# Patient Record
Sex: MALE | Race: WHITE | NOT HISPANIC OR LATINO | URBAN - METROPOLITAN AREA
[De-identification: names, ages, dates, MRNs, and addresses within clinical notes are randomized per-mention and may not be internally consistent; named-entity substitution may affect disease eponyms.]

---

## 2018-11-27 ENCOUNTER — IMPORTED ENCOUNTER (OUTPATIENT)
Dept: URBAN - METROPOLITAN AREA CLINIC 43 | Facility: CLINIC | Age: 83
End: 2018-11-27

## 2018-11-27 PROBLEM — H02.835: Noted: 2018-11-27

## 2018-11-27 PROBLEM — H02.832: Noted: 2018-11-27

## 2018-11-27 PROBLEM — H02.834: Noted: 2018-11-27

## 2018-11-27 PROBLEM — H02.831: Noted: 2018-11-27

## 2018-11-27 PROBLEM — H25.13: Noted: 2018-11-27

## 2018-12-05 ENCOUNTER — IMPORTED ENCOUNTER (OUTPATIENT)
Dept: URBAN - METROPOLITAN AREA CLINIC 43 | Facility: CLINIC | Age: 83
End: 2018-12-05

## 2018-12-05 PROBLEM — H25.13: Noted: 2018-12-05

## 2018-12-05 PROBLEM — H43.813: Noted: 2018-12-05

## 2020-04-19 ASSESSMENT — VISUAL ACUITY
OS_CC: 20/25+2
OD_CC: 20/20-1
OD_PH: 20/30+2
OS_CC: 20/20-1
OS_SC: J3-2
OD_CC: J1+-1
OD_SC: J7
OS_CC: J2+
OS_CC: @ 18"
OS_CC: 20/25
OD_CC: @ 18"
OD_CC: 20/40-2
OS_CC: J1+-2
OS_SC: 20/30
OD_SC: J7
OS_SC: J3-2
OD_CC: J2
OS_SC: 20/25+3
OD_SC: 20/40-2
OS_SC: 20/30-1
OD_SC: 20/25+3
OS_OTHER: 20/40.
OD_SC: 20/30-1
OD_CC: 20/30-2
OD_OTHER: 20/40.

## 2020-04-19 ASSESSMENT — KERATOMETRY
OS_K2POWER_DIOPTERS: 42
OS_AXISANGLE_DEGREES: 25
OS_K1POWER_DIOPTERS: 42.5
OS_AXISANGLE2_DEGREES: 115
OD_K1POWER_DIOPTERS: 42.5
OD_AXISANGLE_DEGREES: 155
OD_K2POWER_DIOPTERS: 41.5
OD_AXISANGLE2_DEGREES: 65

## 2020-04-19 ASSESSMENT — TONOMETRY
OD_IOP_MMHG: 18.0
OD_IOP_MMHG: 17.0
OS_IOP_MMHG: 14.0
OS_IOP_MMHG: 15.0

## 2020-12-02 ENCOUNTER — OFFICE VISIT (OUTPATIENT)
Dept: URBAN - METROPOLITAN AREA CLINIC 68 | Facility: CLINIC | Age: 85
End: 2020-12-02

## 2020-12-31 ENCOUNTER — OFFICE VISIT (OUTPATIENT)
Dept: URBAN - METROPOLITAN AREA CLINIC 68 | Facility: CLINIC | Age: 85
End: 2020-12-31

## 2021-01-20 ENCOUNTER — OFFICE VISIT (OUTPATIENT)
Dept: URBAN - METROPOLITAN AREA CLINIC 68 | Facility: CLINIC | Age: 86
End: 2021-01-20

## 2021-02-12 ENCOUNTER — OFFICE VISIT (OUTPATIENT)
Dept: URBAN - METROPOLITAN AREA CLINIC 68 | Facility: CLINIC | Age: 86
End: 2021-02-12

## 2022-02-21 ENCOUNTER — OFFICE VISIT (OUTPATIENT)
Dept: URBAN - METROPOLITAN AREA CLINIC 68 | Facility: CLINIC | Age: 87
End: 2022-02-21

## 2022-06-04 ENCOUNTER — TELEPHONE ENCOUNTER (OUTPATIENT)
Dept: URBAN - METROPOLITAN AREA CLINIC 68 | Facility: CLINIC | Age: 87
End: 2022-06-04

## 2022-06-04 RX ORDER — TADALAFIL 5 MG/1
CIALIS( 5MG ORAL  QOD ) INACTIVE -HX ENTRY TABLET, FILM COATED ORAL QOD
OUTPATIENT
Start: 2020-12-02

## 2022-06-04 RX ORDER — LAMOTRIGINE 25 MG/1
LAMICTAL( 25MG ORAL 2 TABLETS DAILY ) INACTIVE -HX ENTRY TABLET ORAL DAILY
OUTPATIENT
Start: 2017-03-27

## 2022-06-04 RX ORDER — ASPIRIN 325 MG/1
ASPIRIN( 325MG ORAL  DAILY ) INACTIVE -HX ENTRY TABLET ORAL DAILY
OUTPATIENT
Start: 2017-03-27

## 2022-06-04 RX ORDER — FLUTICASONE PROPIONATE AND SALMETEROL 50; 250 UG/1; UG/1
ADVAIR DISKUS( 250-50MCG/DOSE INHALATION  AS NEEDED ) INACTIVE -HX ENTRY POWDER RESPIRATORY (INHALATION) AS NEEDED
OUTPATIENT
Start: 2017-03-27

## 2022-06-04 RX ORDER — POLYETHYLENE GLYCOL 3350, SODIUM SULFATE, SODIUM CHLORIDE, POTASSIUM CHLORIDE, ASCORBIC ACID, SODIUM ASCORBATE 7.5-2.691G
KIT ORAL
Qty: 1 | Refills: 0 | OUTPATIENT
Start: 2013-11-11 | End: 2017-03-27

## 2022-06-04 RX ORDER — LOSARTAN POTASSIUM 100 MG/1
LOSARTAN POTASSIUM( 100MG ORAL  DAILY ) INACTIVE -HX ENTRY TABLET ORAL DAILY
OUTPATIENT
Start: 2017-03-27

## 2022-06-04 RX ORDER — METHYLCELLULOSE 500 MG/1
TABLET ORAL DAILY
Qty: 60 | Refills: 0 | OUTPATIENT
Start: 2019-01-24 | End: 2019-02-23

## 2022-06-05 ENCOUNTER — TELEPHONE ENCOUNTER (OUTPATIENT)
Dept: URBAN - METROPOLITAN AREA CLINIC 68 | Facility: CLINIC | Age: 87
End: 2022-06-05

## 2022-06-05 RX ORDER — LEVOTHYROXINE SODIUM 125 UG/1
SYNTHROID( 125MCG ORAL  DAILY ) ACTIVE -HX ENTRY TABLET ORAL DAILY
Status: ACTIVE | COMMUNITY
Start: 2022-02-21

## 2022-06-05 RX ORDER — METOPROLOL SUCCINATE 100 MG/1
METOPROLOL SUCCINATE ER( 100MG ORAL 1 DAILY ) ACTIVE -HX ENTRY TABLET, EXTENDED RELEASE ORAL DAILY
Status: ACTIVE | COMMUNITY
Start: 2022-02-21

## 2022-06-05 RX ORDER — FINASTERIDE 5 MG/1
FINASTERIDE( 5MG ORAL 1 DAILY ) ACTIVE -HX ENTRY TABLET, FILM COATED ORAL DAILY
Status: ACTIVE | COMMUNITY
Start: 2022-02-21

## 2022-06-05 RX ORDER — AZELASTINE HYDROCHLORIDE 0.5 MG/ML
AZELASTINE HCL( 0.05% OPHTHALMIC 1 DAILY ) ACTIVE -HX ENTRY SOLUTION/ DROPS OPHTHALMIC DAILY
Status: ACTIVE | COMMUNITY
Start: 2022-02-21

## 2022-06-05 RX ORDER — FAMOTIDINE 20 MG/1
TABLET, FILM COATED ORAL
Qty: 90 | Refills: 90 | Status: ACTIVE | COMMUNITY
Start: 2020-12-02

## 2022-06-05 RX ORDER — LAMOTRIGINE 50 MG/1
LAMOTRIGINE( 50MG ORAL  DAILY ) ACTIVE -HX ENTRY TABLET, ORALLY DISINTEGRATING ORAL DAILY
Status: ACTIVE | COMMUNITY
Start: 2022-02-21

## 2022-06-05 RX ORDER — LORATADINE 10 MG
TABLET,DISINTEGRATING ORAL
Qty: 30 | Refills: 30 | Status: ACTIVE | COMMUNITY
Start: 2019-01-24

## 2022-06-05 RX ORDER — ATORVASTATIN CALCIUM 40 MG/1
ATORVASTATIN CALCIUM( 40MG ORAL 1 DAILY ) ACTIVE -HX ENTRY TABLET, FILM COATED ORAL DAILY
Status: ACTIVE | COMMUNITY
Start: 2022-02-21

## 2022-06-05 RX ORDER — TRAMADOL HYDROCHLORIDE 50 MG/1
TRAMADOL HCL( 50MG ORAL 1 DAILY ) ACTIVE -HX ENTRY TABLET ORAL DAILY
Status: ACTIVE | COMMUNITY
Start: 2022-02-21

## 2022-06-25 ENCOUNTER — TELEPHONE ENCOUNTER (OUTPATIENT)
Age: 87
End: 2022-06-25

## 2022-06-25 RX ORDER — POLYETHYLENE GLYCOL 3350 17 G
MIRALAX(    AS DIRECTED ) INACTIVE -HX ENTRY POWDER IN PACKET (EA) ORAL AS DIRECTED
OUTPATIENT
Start: 2020-12-02

## 2022-06-25 RX ORDER — METHYLCELLULOSE 500 MG/1
TABLET ORAL DAILY
Qty: 60 | Refills: 0 | OUTPATIENT
Start: 2019-01-24 | End: 2019-02-23

## 2022-06-25 RX ORDER — LOSARTAN POTASSIUM 100 MG/1
LOSARTAN POTASSIUM( 100MG ORAL  DAILY ) INACTIVE -HX ENTRY TABLET, FILM COATED ORAL DAILY
OUTPATIENT
Start: 2017-03-27

## 2022-06-25 RX ORDER — POLYETHYLENE GLYCOL 3350, SODIUM SULFATE, SODIUM CHLORIDE, POTASSIUM CHLORIDE, ASCORBIC ACID, SODIUM ASCORBATE 7.5-2.691G
KIT ORAL
Qty: 1 | Refills: 0 | OUTPATIENT
Start: 2013-11-11 | End: 2017-03-27

## 2022-06-25 RX ORDER — ASPIRIN 325 MG/1
ASPIRIN( 325MG ORAL  DAILY ) INACTIVE -HX ENTRY TABLET ORAL DAILY
OUTPATIENT
Start: 2017-03-27

## 2022-06-25 RX ORDER — TADALAFIL 5 MG/1
CIALIS( 5MG ORAL  QOD ) INACTIVE -HX ENTRY TABLET, FILM COATED ORAL QOD
OUTPATIENT
Start: 2020-12-02

## 2022-06-25 RX ORDER — LAMOTRIGINE 25 MG/1
LAMICTAL( 25MG ORAL 2 TABLETS DAILY ) INACTIVE -HX ENTRY TABLET ORAL DAILY
OUTPATIENT
Start: 2017-03-27

## 2022-06-26 ENCOUNTER — TELEPHONE ENCOUNTER (OUTPATIENT)
Age: 87
End: 2022-06-26

## 2022-06-26 RX ORDER — TRAMADOL HYDROCHLORIDE 50 MG/1
TRAMADOL HCL( 50MG ORAL 1 DAILY ) ACTIVE -HX ENTRY TABLET ORAL DAILY
Status: ACTIVE | COMMUNITY
Start: 2022-02-21

## 2022-06-26 RX ORDER — TRIAMCINOLONE ACETONIDE 55 UG/1
NASACORT( 55MCG/ACT NASAL 1 DAILY ) ACTIVE -HX ENTRY SPRAY, METERED NASAL DAILY
Status: ACTIVE | COMMUNITY
Start: 2022-02-21

## 2022-06-26 RX ORDER — ATORVASTATIN CALCIUM 40 MG/1
ATORVASTATIN CALCIUM( 40MG ORAL 1 DAILY ) ACTIVE -HX ENTRY TABLET, FILM COATED ORAL DAILY
Status: ACTIVE | COMMUNITY
Start: 2022-02-21

## 2022-06-26 RX ORDER — AZELASTINE HYDROCHLORIDE 0.5 MG/ML
AZELASTINE HCL( 0.05% OPHTHALMIC 1 DAILY ) ACTIVE -HX ENTRY SOLUTION/ DROPS INTRAOCULAR DAILY
Status: ACTIVE | COMMUNITY
Start: 2022-02-21

## 2022-06-26 RX ORDER — LAMOTRIGINE 50 MG/1
LAMOTRIGINE( 50MG ORAL  DAILY ) ACTIVE -HX ENTRY TABLET ORAL DAILY
Status: ACTIVE | COMMUNITY
Start: 2022-02-21

## 2022-06-26 RX ORDER — FINASTERIDE 5 MG/1
FINASTERIDE( 5MG ORAL 1 DAILY ) ACTIVE -HX ENTRY TABLET, FILM COATED ORAL DAILY
Status: ACTIVE | COMMUNITY
Start: 2022-02-21

## 2022-06-26 RX ORDER — METOPROLOL SUCCINATE 100 MG/1
METOPROLOL SUCCINATE ER( 100MG ORAL 1 DAILY ) ACTIVE -HX ENTRY TABLET, EXTENDED RELEASE ORAL DAILY
Status: ACTIVE | COMMUNITY
Start: 2022-02-21

## 2022-06-26 RX ORDER — ASPIRIN 81 MG/1
ASPIRIN( 162MG ORAL 1 DAILY ) ACTIVE -HX ENTRY TABLET, COATED ORAL DAILY
Status: ACTIVE | COMMUNITY
Start: 2022-02-21

## 2022-06-26 RX ORDER — POLYETHYLENE GLYCOL 3350 17 G
POWDER IN PACKET (EA) ORAL
Qty: 30 | Refills: 30 | Status: ACTIVE | COMMUNITY
Start: 2019-01-24

## 2022-06-26 RX ORDER — FAMOTIDINE 20 MG/1
TABLET ORAL
Qty: 90 | Refills: 90 | Status: ACTIVE | COMMUNITY
Start: 2020-12-02

## 2022-06-26 RX ORDER — LEVOTHYROXINE SODIUM 125 MCG
SYNTHROID( 125MCG ORAL  DAILY ) ACTIVE -HX ENTRY TABLET ORAL DAILY
Status: ACTIVE | COMMUNITY
Start: 2022-02-21

## 2022-10-26 ENCOUNTER — OFFICE VISIT (OUTPATIENT)
Dept: URBAN - METROPOLITAN AREA CLINIC 68 | Facility: CLINIC | Age: 87
End: 2022-10-26

## 2022-10-26 RX ORDER — FINASTERIDE 5 MG/1
FINASTERIDE( 5MG ORAL 1 DAILY ) ACTIVE -HX ENTRY TABLET, FILM COATED ORAL DAILY
Status: ACTIVE | COMMUNITY
Start: 2022-02-21

## 2022-10-26 RX ORDER — LEVOTHYROXINE SODIUM 125 UG/1
SYNTHROID( 125MCG ORAL  DAILY ) ACTIVE -HX ENTRY TABLET ORAL DAILY
Status: ACTIVE | COMMUNITY
Start: 2022-02-21

## 2022-10-26 RX ORDER — LAMOTRIGINE 50 MG/1
LAMOTRIGINE( 50MG ORAL  DAILY ) ACTIVE -HX ENTRY TABLET, ORALLY DISINTEGRATING ORAL DAILY
Status: ACTIVE | COMMUNITY
Start: 2022-02-21

## 2022-10-26 RX ORDER — ATORVASTATIN CALCIUM 40 MG/1
ATORVASTATIN CALCIUM( 40MG ORAL 1 DAILY ) ACTIVE -HX ENTRY TABLET, FILM COATED ORAL DAILY
Status: ACTIVE | COMMUNITY
Start: 2022-02-21

## 2022-10-26 RX ORDER — METOPROLOL SUCCINATE 100 MG/1
METOPROLOL SUCCINATE ER( 100MG ORAL 1 DAILY ) ACTIVE -HX ENTRY TABLET, EXTENDED RELEASE ORAL DAILY
Status: ACTIVE | COMMUNITY
Start: 2022-02-21

## 2022-10-26 RX ORDER — AZELASTINE HYDROCHLORIDE 0.5 MG/ML
AZELASTINE HCL( 0.05% OPHTHALMIC 1 DAILY ) ACTIVE -HX ENTRY SOLUTION/ DROPS OPHTHALMIC DAILY
Status: ACTIVE | COMMUNITY
Start: 2022-02-21

## 2022-10-26 RX ORDER — TRAMADOL HYDROCHLORIDE 50 MG/1
TRAMADOL HCL( 50MG ORAL 1 DAILY ) ACTIVE -HX ENTRY TABLET ORAL DAILY
Status: ACTIVE | COMMUNITY
Start: 2022-02-21

## 2022-10-26 RX ORDER — FAMOTIDINE 20 MG/1
1 TABLET TABLET, FILM COATED ORAL ONCE A DAY
Qty: 30 TABLET | Refills: 90 | Status: ACTIVE | COMMUNITY
Start: 2020-12-02

## 2022-10-26 RX ORDER — LORATADINE 10 MG
TABLET,DISINTEGRATING ORAL
Qty: 30 | Refills: 30 | Status: ACTIVE | COMMUNITY
Start: 2019-01-24

## 2022-10-26 NOTE — HPI-MIGRATED HPI
General : BM's are good has his still with cough and phlegm and indigestion ,  occasionally, and can be severe  Has COPD and asthma , uses inhalers PRN once a week  Can get coughing after chocolate or cereal with milk

## 2022-11-28 ENCOUNTER — OFFICE VISIT (OUTPATIENT)
Dept: URBAN - METROPOLITAN AREA CLINIC 68 | Facility: CLINIC | Age: 87
End: 2022-11-28

## 2022-11-28 RX ORDER — LEVOTHYROXINE SODIUM 125 UG/1
SYNTHROID( 125MCG ORAL  DAILY ) ACTIVE -HX ENTRY TABLET ORAL DAILY
Status: ACTIVE | COMMUNITY
Start: 2022-02-21

## 2022-11-28 RX ORDER — FAMOTIDINE 20 MG/1
1 TABLET TABLET, FILM COATED ORAL ONCE A DAY
OUTPATIENT
Start: 2020-12-02

## 2022-11-28 RX ORDER — FAMOTIDINE 20 MG/1
1 TABLET TABLET, FILM COATED ORAL ONCE A DAY
Qty: 30 TABLET | Refills: 90 | Status: ON HOLD | COMMUNITY
Start: 2020-12-02

## 2022-11-28 RX ORDER — AZELASTINE HYDROCHLORIDE 0.5 MG/ML
AZELASTINE HCL( 0.05% OPHTHALMIC 1 DAILY ) ACTIVE -HX ENTRY SOLUTION/ DROPS OPHTHALMIC DAILY
Status: ACTIVE | COMMUNITY
Start: 2022-02-21

## 2022-11-28 RX ORDER — TRAMADOL HYDROCHLORIDE 50 MG/1
TRAMADOL HCL( 50MG ORAL 1 DAILY ) ACTIVE -HX ENTRY TABLET ORAL DAILY
Status: ACTIVE | COMMUNITY
Start: 2022-02-21

## 2022-11-28 RX ORDER — METOPROLOL SUCCINATE 100 MG/1
METOPROLOL SUCCINATE ER( 100MG ORAL 1 DAILY ) ACTIVE -HX ENTRY TABLET, EXTENDED RELEASE ORAL DAILY
Status: ACTIVE | COMMUNITY
Start: 2022-02-21

## 2022-11-28 RX ORDER — FINASTERIDE 5 MG/1
FINASTERIDE( 5MG ORAL 1 DAILY ) ACTIVE -HX ENTRY TABLET, FILM COATED ORAL DAILY
Status: ACTIVE | COMMUNITY
Start: 2022-02-21

## 2022-11-28 RX ORDER — LAMOTRIGINE 50 MG/1
LAMOTRIGINE( 50MG ORAL  DAILY ) ACTIVE -HX ENTRY TABLET, ORALLY DISINTEGRATING ORAL DAILY
Status: ACTIVE | COMMUNITY
Start: 2022-02-21

## 2022-11-28 RX ORDER — ATORVASTATIN CALCIUM 40 MG/1
ATORVASTATIN CALCIUM( 40MG ORAL 1 DAILY ) ACTIVE -HX ENTRY TABLET, FILM COATED ORAL DAILY
Status: ACTIVE | COMMUNITY
Start: 2022-02-21

## 2022-11-28 RX ORDER — LORATADINE 10 MG
TABLET,DISINTEGRATING ORAL
Qty: 30 | Refills: 30 | Status: ACTIVE | COMMUNITY
Start: 2019-01-24

## 2022-11-28 RX ORDER — ESOMEPRAZOLE MAGNESIUM 20 MG/1
1 CAPSULE CAPSULE, DELAYED RELEASE ORAL ONCE A DAY
Status: ACTIVE | COMMUNITY

## 2022-11-28 NOTE — HPI-MIGRATED HPI
General : Episode severe abd pan and vomiting ( releived pain ) went to [facility]  ER , labs ok  recently  constipated  last few weeks  BMs are diferent, small BMs  Now takes citrucel , improved BMs bu more gas

## 2022-12-02 ENCOUNTER — TELEPHONE ENCOUNTER (OUTPATIENT)
Dept: URBAN - METROPOLITAN AREA CLINIC 68 | Facility: CLINIC | Age: 87
End: 2022-12-02

## 2022-12-07 ENCOUNTER — TELEPHONE ENCOUNTER (OUTPATIENT)
Dept: URBAN - METROPOLITAN AREA CLINIC 68 | Facility: CLINIC | Age: 87
End: 2022-12-07

## 2022-12-07 RX ORDER — OMEPRAZOLE 20 MG/1
1 CAPSULE CAPSULE, DELAYED RELEASE ORAL ONCE A DAY
Qty: 30 CAPSULE | Refills: 3 | OUTPATIENT
Start: 2022-12-08

## 2022-12-07 RX ORDER — FAMOTIDINE 20 MG/1
1 TABLET TABLET, FILM COATED ORAL ONCE A DAY
OUTPATIENT
Start: 2020-12-02

## 2022-12-13 ENCOUNTER — OFFICE VISIT (OUTPATIENT)
Dept: URBAN - METROPOLITAN AREA SURGERY CENTER 12 | Facility: SURGERY CENTER | Age: 87
End: 2022-12-13

## 2022-12-13 RX ORDER — LAMOTRIGINE 50 MG/1
LAMOTRIGINE( 50MG ORAL  DAILY ) ACTIVE -HX ENTRY TABLET, ORALLY DISINTEGRATING ORAL DAILY
Status: ACTIVE | COMMUNITY
Start: 2022-02-21

## 2022-12-13 RX ORDER — AZELASTINE HYDROCHLORIDE 0.5 MG/ML
AZELASTINE HCL( 0.05% OPHTHALMIC 1 DAILY ) ACTIVE -HX ENTRY SOLUTION/ DROPS OPHTHALMIC DAILY
Status: ACTIVE | COMMUNITY
Start: 2022-02-21

## 2022-12-13 RX ORDER — LORATADINE 10 MG
TABLET,DISINTEGRATING ORAL
Qty: 30 | Refills: 30 | Status: ACTIVE | COMMUNITY
Start: 2019-01-24

## 2022-12-13 RX ORDER — METOPROLOL SUCCINATE 100 MG/1
METOPROLOL SUCCINATE ER( 100MG ORAL 1 DAILY ) ACTIVE -HX ENTRY TABLET, EXTENDED RELEASE ORAL DAILY
Status: ACTIVE | COMMUNITY
Start: 2022-02-21

## 2022-12-13 RX ORDER — FINASTERIDE 5 MG/1
FINASTERIDE( 5MG ORAL 1 DAILY ) ACTIVE -HX ENTRY TABLET, FILM COATED ORAL DAILY
Status: ACTIVE | COMMUNITY
Start: 2022-02-21

## 2022-12-13 RX ORDER — ESOMEPRAZOLE MAGNESIUM 20 MG/1
1 CAPSULE CAPSULE, DELAYED RELEASE ORAL ONCE A DAY
Status: ACTIVE | COMMUNITY

## 2022-12-13 RX ORDER — ATORVASTATIN CALCIUM 40 MG/1
ATORVASTATIN CALCIUM( 40MG ORAL 1 DAILY ) ACTIVE -HX ENTRY TABLET, FILM COATED ORAL DAILY
Status: ACTIVE | COMMUNITY
Start: 2022-02-21

## 2022-12-13 RX ORDER — TRAMADOL HYDROCHLORIDE 50 MG/1
TRAMADOL HCL( 50MG ORAL 1 DAILY ) ACTIVE -HX ENTRY TABLET ORAL DAILY
Status: ACTIVE | COMMUNITY
Start: 2022-02-21

## 2022-12-13 RX ORDER — OMEPRAZOLE 20 MG/1
1 CAPSULE CAPSULE, DELAYED RELEASE ORAL ONCE A DAY
Qty: 30 CAPSULE | Refills: 3 | Status: ACTIVE | COMMUNITY
Start: 2022-12-08

## 2022-12-13 RX ORDER — LEVOTHYROXINE SODIUM 125 UG/1
SYNTHROID( 125MCG ORAL  DAILY ) ACTIVE -HX ENTRY TABLET ORAL DAILY
Status: ACTIVE | COMMUNITY
Start: 2022-02-21

## 2022-12-16 ENCOUNTER — TELEPHONE ENCOUNTER (OUTPATIENT)
Dept: URBAN - METROPOLITAN AREA CLINIC 68 | Facility: CLINIC | Age: 87
End: 2022-12-16

## 2022-12-18 ENCOUNTER — TELEPHONE ENCOUNTER (OUTPATIENT)
Dept: URBAN - METROPOLITAN AREA CLINIC 68 | Facility: CLINIC | Age: 87
End: 2022-12-18

## 2022-12-23 ENCOUNTER — TELEPHONE ENCOUNTER (OUTPATIENT)
Dept: URBAN - METROPOLITAN AREA CLINIC 68 | Facility: CLINIC | Age: 87
End: 2022-12-23

## 2022-12-23 ENCOUNTER — OFFICE VISIT (OUTPATIENT)
Dept: URBAN - METROPOLITAN AREA CLINIC 68 | Facility: CLINIC | Age: 87
End: 2022-12-23

## 2022-12-23 RX ORDER — OMEPRAZOLE 20 MG/1
1 CAPSULE CAPSULE, DELAYED RELEASE ORAL ONCE A DAY
Qty: 30 CAPSULE | Refills: 3 | Status: ACTIVE | COMMUNITY
Start: 2022-12-08

## 2022-12-23 RX ORDER — METOPROLOL SUCCINATE 100 MG/1
METOPROLOL SUCCINATE ER( 100MG ORAL 1 DAILY ) ACTIVE -HX ENTRY TABLET, EXTENDED RELEASE ORAL DAILY
Status: ACTIVE | COMMUNITY
Start: 2022-02-21

## 2022-12-23 RX ORDER — ESOMEPRAZOLE MAGNESIUM 20 MG/1
1 CAPSULE CAPSULE, DELAYED RELEASE ORAL ONCE A DAY
OUTPATIENT

## 2022-12-23 RX ORDER — TRAMADOL HYDROCHLORIDE 50 MG/1
TRAMADOL HCL( 50MG ORAL 1 DAILY ) ACTIVE -HX ENTRY TABLET ORAL DAILY
Status: ACTIVE | COMMUNITY
Start: 2022-02-21

## 2022-12-23 RX ORDER — LAMOTRIGINE 50 MG/1
LAMOTRIGINE( 50MG ORAL  DAILY ) ACTIVE -HX ENTRY TABLET, ORALLY DISINTEGRATING ORAL DAILY
Status: ACTIVE | COMMUNITY
Start: 2022-02-21

## 2022-12-23 RX ORDER — ATORVASTATIN CALCIUM 40 MG/1
ATORVASTATIN CALCIUM( 40MG ORAL 1 DAILY ) ACTIVE -HX ENTRY TABLET, FILM COATED ORAL DAILY
Status: ACTIVE | COMMUNITY
Start: 2022-02-21

## 2022-12-23 RX ORDER — LORATADINE 10 MG
TABLET,DISINTEGRATING ORAL
Qty: 30 | Refills: 30 | Status: ACTIVE | COMMUNITY
Start: 2019-01-24

## 2022-12-23 RX ORDER — AZELASTINE HYDROCHLORIDE 0.5 MG/ML
AZELASTINE HCL( 0.05% OPHTHALMIC 1 DAILY ) ACTIVE -HX ENTRY SOLUTION/ DROPS OPHTHALMIC DAILY
Status: ACTIVE | COMMUNITY
Start: 2022-02-21

## 2022-12-23 RX ORDER — ESOMEPRAZOLE MAGNESIUM 20 MG/1
1 CAPSULE CAPSULE, DELAYED RELEASE ORAL ONCE A DAY
Status: ACTIVE | COMMUNITY

## 2022-12-23 RX ORDER — FINASTERIDE 5 MG/1
FINASTERIDE( 5MG ORAL 1 DAILY ) ACTIVE -HX ENTRY TABLET, FILM COATED ORAL DAILY
Status: ACTIVE | COMMUNITY
Start: 2022-02-21

## 2022-12-23 RX ORDER — LEVOTHYROXINE SODIUM 125 UG/1
SYNTHROID( 125MCG ORAL  DAILY ) ACTIVE -HX ENTRY TABLET ORAL DAILY
Status: ACTIVE | COMMUNITY
Start: 2022-02-21

## 2022-12-23 NOTE — HPI-MIGRATED HPI
General : telemed call , no pain or vomiting , severe pain and vomiting releived it  suspicious for symptomatic hernia he will go to the er if those symptoms recure egd in 2 weeks, pending card gloi in 1 week

## 2023-01-01 ENCOUNTER — TELEPHONE ENCOUNTER (OUTPATIENT)
Dept: URBAN - METROPOLITAN AREA CLINIC 68 | Facility: CLINIC | Age: 88
End: 2023-01-01

## 2023-01-12 ENCOUNTER — TELEPHONE ENCOUNTER (OUTPATIENT)
Dept: URBAN - METROPOLITAN AREA CLINIC 68 | Facility: CLINIC | Age: 88
End: 2023-01-12

## 2023-01-13 ENCOUNTER — OFFICE VISIT (OUTPATIENT)
Dept: URBAN - METROPOLITAN AREA CLINIC 68 | Facility: CLINIC | Age: 88
End: 2023-01-13

## 2023-01-13 RX ORDER — LORATADINE 10 MG
TABLET,DISINTEGRATING ORAL
Qty: 30 | Refills: 30 | Status: ACTIVE | COMMUNITY
Start: 2019-01-24

## 2023-01-13 RX ORDER — OMEPRAZOLE 20 MG/1
1 CAPSULE CAPSULE, DELAYED RELEASE ORAL ONCE A DAY
Qty: 30 CAPSULE | Refills: 3 | Status: ACTIVE | COMMUNITY
Start: 2022-12-08

## 2023-01-13 RX ORDER — TRAMADOL HYDROCHLORIDE 50 MG/1
TRAMADOL HCL( 50MG ORAL 1 DAILY ) ACTIVE -HX ENTRY TABLET ORAL DAILY
Status: ACTIVE | COMMUNITY
Start: 2022-02-21

## 2023-01-13 RX ORDER — AZELASTINE HYDROCHLORIDE 0.5 MG/ML
AZELASTINE HCL( 0.05% OPHTHALMIC 1 DAILY ) ACTIVE -HX ENTRY SOLUTION/ DROPS OPHTHALMIC DAILY
Status: ACTIVE | COMMUNITY
Start: 2022-02-21

## 2023-01-13 RX ORDER — METOPROLOL SUCCINATE 100 MG/1
METOPROLOL SUCCINATE ER( 100MG ORAL 1 DAILY ) ACTIVE -HX ENTRY TABLET, EXTENDED RELEASE ORAL DAILY
Status: ACTIVE | COMMUNITY
Start: 2022-02-21

## 2023-01-13 RX ORDER — ATORVASTATIN CALCIUM 40 MG/1
ATORVASTATIN CALCIUM( 40MG ORAL 1 DAILY ) ACTIVE -HX ENTRY TABLET, FILM COATED ORAL DAILY
Status: ACTIVE | COMMUNITY
Start: 2022-02-21

## 2023-01-13 RX ORDER — LAMOTRIGINE 50 MG/1
LAMOTRIGINE( 50MG ORAL  DAILY ) ACTIVE -HX ENTRY TABLET, ORALLY DISINTEGRATING ORAL DAILY
Status: ACTIVE | COMMUNITY
Start: 2022-02-21

## 2023-01-13 RX ORDER — ESOMEPRAZOLE MAGNESIUM 20 MG/1
1 CAPSULE CAPSULE, DELAYED RELEASE ORAL ONCE A DAY
Status: ACTIVE | COMMUNITY

## 2023-01-13 RX ORDER — LEVOTHYROXINE SODIUM 125 UG/1
SYNTHROID( 125MCG ORAL  DAILY ) ACTIVE -HX ENTRY TABLET ORAL DAILY
Status: ACTIVE | COMMUNITY
Start: 2022-02-21

## 2023-01-13 RX ORDER — FINASTERIDE 5 MG/1
FINASTERIDE( 5MG ORAL 1 DAILY ) ACTIVE -HX ENTRY TABLET, FILM COATED ORAL DAILY
Status: ACTIVE | COMMUNITY
Start: 2022-02-21

## 2023-01-13 NOTE — HPI-MIGRATED HPI
General : [employer] has improved his cough , CT reveals pulmonary nodule and large HH TELEMED call, no pain or vomiting, improved  suspicious for symptomatic hernia ,needs staging egd , and hold on surveillance colon til later  he will go to the er if those symptoms secure REC  EGD

## 2023-01-17 ENCOUNTER — OFFICE VISIT (OUTPATIENT)
Dept: URBAN - METROPOLITAN AREA SURGERY CENTER 12 | Facility: SURGERY CENTER | Age: 88
End: 2023-01-17

## 2023-01-18 ENCOUNTER — TELEPHONE ENCOUNTER (OUTPATIENT)
Dept: URBAN - METROPOLITAN AREA CLINIC 68 | Facility: CLINIC | Age: 88
End: 2023-01-18

## 2023-02-17 ENCOUNTER — OFFICE VISIT (OUTPATIENT)
Dept: URBAN - METROPOLITAN AREA CLINIC 68 | Facility: CLINIC | Age: 88
End: 2023-02-17

## 2023-02-17 ENCOUNTER — DASHBOARD ENCOUNTERS (OUTPATIENT)
Age: 88
End: 2023-02-17

## 2023-02-17 RX ORDER — ESOMEPRAZOLE MAGNESIUM 20 MG/1
1 CAPSULE CAPSULE, DELAYED RELEASE ORAL ONCE A DAY
OUTPATIENT

## 2023-02-17 RX ORDER — ESOMEPRAZOLE MAGNESIUM 20 MG/1
1 CAPSULE CAPSULE, DELAYED RELEASE ORAL ONCE A DAY
Status: ACTIVE | COMMUNITY

## 2023-02-17 RX ORDER — FINASTERIDE 5 MG/1
FINASTERIDE( 5MG ORAL 1 DAILY ) ACTIVE -HX ENTRY TABLET, FILM COATED ORAL DAILY
Status: ACTIVE | COMMUNITY
Start: 2022-02-21

## 2023-02-17 RX ORDER — LEVOTHYROXINE SODIUM 125 UG/1
SYNTHROID( 125MCG ORAL  DAILY ) ACTIVE -HX ENTRY TABLET ORAL DAILY
Status: ACTIVE | COMMUNITY
Start: 2022-02-21

## 2023-02-17 RX ORDER — AZELASTINE HYDROCHLORIDE 0.5 MG/ML
AZELASTINE HCL( 0.05% OPHTHALMIC 1 DAILY ) ACTIVE -HX ENTRY SOLUTION/ DROPS OPHTHALMIC DAILY
Status: ACTIVE | COMMUNITY
Start: 2022-02-21

## 2023-02-17 RX ORDER — LORATADINE 10 MG
TABLET,DISINTEGRATING ORAL
Qty: 30 | Refills: 30 | Status: ACTIVE | COMMUNITY
Start: 2019-01-24

## 2023-02-17 RX ORDER — TRAMADOL HYDROCHLORIDE 50 MG/1
TRAMADOL HCL( 50MG ORAL 1 DAILY ) ACTIVE -HX ENTRY TABLET ORAL DAILY
Status: ACTIVE | COMMUNITY
Start: 2022-02-21

## 2023-02-17 RX ORDER — LAMOTRIGINE 50 MG/1
LAMOTRIGINE( 50MG ORAL  DAILY ) ACTIVE -HX ENTRY TABLET, ORALLY DISINTEGRATING ORAL DAILY
Status: ACTIVE | COMMUNITY
Start: 2022-02-21

## 2023-02-17 RX ORDER — METOPROLOL SUCCINATE 100 MG/1
METOPROLOL SUCCINATE ER( 100MG ORAL 1 DAILY ) ACTIVE -HX ENTRY TABLET, EXTENDED RELEASE ORAL DAILY
Status: ACTIVE | COMMUNITY
Start: 2022-02-21

## 2023-02-17 RX ORDER — ATORVASTATIN CALCIUM 40 MG/1
ATORVASTATIN CALCIUM( 40MG ORAL 1 DAILY ) ACTIVE -HX ENTRY TABLET, FILM COATED ORAL DAILY
Status: ACTIVE | COMMUNITY
Start: 2022-02-21

## 2023-02-17 NOTE — HPI-MIGRATED HPI
General : Pt now has recent pneumonia , required iV antibiotics for 3 days  , , now on antibiotics po ,  Still cough and SOB , NOw on Levquin FU Dr Xie in 3 days  Discussed prior consultation at [facility] in [place] PREVIOUSLY  [employer] has improved his cough , CT reveals pulmonary nodule and large HH  TELEMED call, no pain or vomiting, improved  suspicious for symptomatic hernia ,needs staging egd , and hold on surveillance colon til later  he will go to the er if those symptoms secure REC  EGD

## 2025-02-03 ENCOUNTER — TELEPHONE ENCOUNTER (OUTPATIENT)
Dept: URBAN - METROPOLITAN AREA CLINIC 68 | Facility: CLINIC | Age: OVER 89
End: 2025-02-03

## 2025-02-05 ENCOUNTER — OFFICE VISIT (OUTPATIENT)
Dept: URBAN - METROPOLITAN AREA CLINIC 68 | Facility: CLINIC | Age: OVER 89
End: 2025-02-05

## 2025-02-05 RX ORDER — LORATADINE 10 MG
TABLET,DISINTEGRATING ORAL
Qty: 30 | Refills: 30 | Status: ACTIVE | COMMUNITY
Start: 2019-01-24

## 2025-02-05 RX ORDER — ATORVASTATIN CALCIUM 40 MG/1
ATORVASTATIN CALCIUM( 40MG ORAL 1 DAILY ) ACTIVE -HX ENTRY TABLET, FILM COATED ORAL DAILY
Status: ACTIVE | COMMUNITY
Start: 2022-02-21

## 2025-02-05 RX ORDER — METOPROLOL SUCCINATE 100 MG/1
METOPROLOL SUCCINATE ER( 100MG ORAL 1 DAILY ) ACTIVE -HX ENTRY TABLET, EXTENDED RELEASE ORAL DAILY
Status: ACTIVE | COMMUNITY
Start: 2022-02-21

## 2025-02-05 RX ORDER — LEVOTHYROXINE SODIUM 125 UG/1
SYNTHROID( 125MCG ORAL  DAILY ) ACTIVE -HX ENTRY TABLET ORAL DAILY
Status: ACTIVE | COMMUNITY
Start: 2022-02-21

## 2025-02-05 RX ORDER — LAMOTRIGINE 50 MG/1
LAMOTRIGINE( 50MG ORAL  DAILY ) ACTIVE -HX ENTRY TABLET, ORALLY DISINTEGRATING ORAL DAILY
Status: ACTIVE | COMMUNITY
Start: 2022-02-21

## 2025-02-05 RX ORDER — TRAMADOL HYDROCHLORIDE 50 MG/1
TRAMADOL HCL( 50MG ORAL 1 DAILY ) ACTIVE -HX ENTRY TABLET, FILM COATED ORAL DAILY
Status: ACTIVE | COMMUNITY
Start: 2022-02-21

## 2025-02-05 RX ORDER — FINASTERIDE 5 MG/1
FINASTERIDE( 5MG ORAL 1 DAILY ) ACTIVE -HX ENTRY TABLET, FILM COATED ORAL DAILY
Status: ACTIVE | COMMUNITY
Start: 2022-02-21

## 2025-02-05 RX ORDER — AZELASTINE HYDROCHLORIDE 0.5 MG/ML
AZELASTINE HCL( 0.05% OPHTHALMIC 1 DAILY ) ACTIVE -HX ENTRY SOLUTION/ DROPS OPHTHALMIC DAILY
Status: ACTIVE | COMMUNITY
Start: 2022-02-21

## 2025-02-05 RX ORDER — ESOMEPRAZOLE MAGNESIUM 20 MG/1
1 CAPSULE CAPSULE, DELAYED RELEASE ORAL ONCE A DAY
Status: ACTIVE | COMMUNITY

## 2025-02-05 NOTE — HPI-TODAY'S VISIT:
Pt now has recent pneumonia , required iV antibiotics for 3 days , , now on antibiotics po ,        Still cough and SOB , NOw on Levquin        FU Dr Xie in 3 days        Discussed prior consultation at [facility] in [place]        PREVIOUSLY [employer] Rx has improved his cough , CT reveals pulmonary nodule and large HH        TELEMED call,        no pain or vomiting, improved        suspicious for symptomatic hernia ,needs staging egd , and hold on surveillance colon til later        he will go to the er if those symptoms secure        REC EGD.

## 2025-02-20 ENCOUNTER — OFFICE VISIT (OUTPATIENT)
Dept: URBAN - METROPOLITAN AREA CLINIC 68 | Facility: CLINIC | Age: OVER 89
End: 2025-02-20

## 2025-03-06 ENCOUNTER — OFFICE VISIT (OUTPATIENT)
Dept: URBAN - METROPOLITAN AREA CLINIC 68 | Facility: CLINIC | Age: OVER 89
End: 2025-03-06
Payer: MEDICARE

## 2025-03-06 VITALS
WEIGHT: 167 LBS | HEIGHT: 69 IN | SYSTOLIC BLOOD PRESSURE: 116 MMHG | BODY MASS INDEX: 24.73 KG/M2 | DIASTOLIC BLOOD PRESSURE: 80 MMHG

## 2025-03-06 DIAGNOSIS — R13.10 DYSPHAGIA: ICD-10-CM

## 2025-03-06 DIAGNOSIS — J69.0 ASPIRATION PNEUMONIA: ICD-10-CM

## 2025-03-06 DIAGNOSIS — R93.89 ABNORMAL CT OF THE CHEST: ICD-10-CM

## 2025-03-06 DIAGNOSIS — K44.9 HIATAL HERNIA: ICD-10-CM

## 2025-03-06 DIAGNOSIS — R11.10 VOMITING: ICD-10-CM

## 2025-03-06 PROCEDURE — 99213 OFFICE O/P EST LOW 20 MIN: CPT | Performed by: SPECIALIST

## 2025-03-06 RX ORDER — LEVOTHYROXINE SODIUM 125 UG/1
SYNTHROID( 125MCG ORAL  DAILY ) ACTIVE -HX ENTRY TABLET ORAL DAILY
Status: ACTIVE | COMMUNITY
Start: 2022-02-21

## 2025-03-06 RX ORDER — ATORVASTATIN CALCIUM 40 MG/1
ATORVASTATIN CALCIUM( 40MG ORAL 1 DAILY ) ACTIVE -HX ENTRY TABLET, FILM COATED ORAL DAILY
Status: ACTIVE | COMMUNITY
Start: 2022-02-21

## 2025-03-06 RX ORDER — METOPROLOL SUCCINATE 100 MG/1
METOPROLOL SUCCINATE ER( 100MG ORAL 1 DAILY ) ACTIVE -HX ENTRY TABLET, EXTENDED RELEASE ORAL DAILY
Status: ON HOLD | COMMUNITY
Start: 2022-02-21

## 2025-03-06 RX ORDER — ESOMEPRAZOLE MAGNESIUM 20 MG/1
1 CAPSULE CAPSULE, DELAYED RELEASE ORAL ONCE A DAY
Status: ON HOLD | COMMUNITY

## 2025-03-06 RX ORDER — FINASTERIDE 5 MG/1
FINASTERIDE( 5MG ORAL 1 DAILY ) ACTIVE -HX ENTRY TABLET, FILM COATED ORAL DAILY
Status: ACTIVE | COMMUNITY
Start: 2022-02-21

## 2025-03-06 RX ORDER — AZELASTINE HYDROCHLORIDE 0.5 MG/ML
AZELASTINE HCL( 0.05% OPHTHALMIC 1 DAILY ) ACTIVE -HX ENTRY SOLUTION/ DROPS OPHTHALMIC DAILY
Status: ACTIVE | COMMUNITY
Start: 2022-02-21

## 2025-03-06 RX ORDER — PANTOPRAZOLE SODIUM 40 MG/1
1 TABLET TABLET, DELAYED RELEASE ORAL ONCE A DAY
Qty: 90 TABLET | Refills: 3 | OUTPATIENT
Start: 2025-03-06

## 2025-03-06 RX ORDER — TRAMADOL HYDROCHLORIDE 50 MG/1
TRAMADOL HCL( 50MG ORAL 1 DAILY ) ACTIVE -HX ENTRY TABLET, FILM COATED ORAL DAILY
Status: ON HOLD | COMMUNITY
Start: 2022-02-21

## 2025-03-06 RX ORDER — LORATADINE 10 MG
TABLET,DISINTEGRATING ORAL
Qty: 30 | Refills: 30 | Status: ACTIVE | COMMUNITY
Start: 2019-01-24

## 2025-03-06 RX ORDER — LAMOTRIGINE 50 MG/1
LAMOTRIGINE( 50MG ORAL  DAILY ) ACTIVE -HX ENTRY TABLET, ORALLY DISINTEGRATING ORAL DAILY
Status: ACTIVE | COMMUNITY
Start: 2022-02-21

## 2025-03-06 NOTE — HPI-TODAY'S VISIT:
3/6 pt decided against surgery  symptoms are stable but Peristeen, no pneumonia lately    seems stable   PLan cont to observe and consider surgery if appropriate    PRIOR Pt now has recent pneumonia, required iV antibiotics for 3 days,, now on antibiotics po,        Still cough and SOB, NOw on Levaquin        FU Dr Xie in 3 days        Discussed prior consultation at [facility] in [place]        PREVIOUSLY [employer] Rx has improved his cough, CT reveals pulmonary nodule and large HH        TELEMED call,        no pain or vomiting, improved        suspicious for symptomatic hernia, needs staging EGD, and hold on surveillance colon til later        he will go to the er if those symptoms secure        REC EGD.